# Patient Record
Sex: FEMALE | Race: WHITE | Employment: OTHER | URBAN - METROPOLITAN AREA
[De-identification: names, ages, dates, MRNs, and addresses within clinical notes are randomized per-mention and may not be internally consistent; named-entity substitution may affect disease eponyms.]

---

## 2018-08-01 LAB — COLONOSCOPY, EXTERNAL: NORMAL

## 2018-08-24 RX ORDER — LORAZEPAM 0.5 MG/1
0.12 TABLET ORAL
COMMUNITY

## 2018-08-24 NOTE — PERIOP NOTES
[facility]  Ambulatory Surgery Unit  Pre-operative Instructions for Endo Procedures    Procedure Date  8/29            Tentative Arrival Time 0900      1. On the day of your procedure, please report to the Ambulatory Surgery Unit Registration Desk and sign in at your designated time. The Ambulatory Surgery Unit is located in [facility] on the [facility] side of the [facility] across from the [address] [place] [address]. Please have all of your health insurance cards and a photo ID. 2. You must have someone with you to drive you home, as you should not drive a car for 24 hours following anesthesia. Please make arrangements for a responsible adult friend or family member to stay with you for at least the first 24 hours after your procedure. 3. Do not have anything to eat or drink (including water, gum, mints, coffee, juice) after midnight   8/28. This may not apply to medications prescribed by your physician. (Please note below the special instructions with medications to take the morning of your procedure.)    4. If applicable, follow the clear liquid diet and bowel prep instructions provided by your physician's office. If you do not have this information, or have any questions, please contact your physician's office. 5. We recommend you do not drink any alcoholic beverages for 24 hours before and after your procedure. 6. Contact your surgeons office for instructions on the following medications: non-steroidal anti-inflammatory drugs (i.e. Advil, Aleve), vitamins, and supplements. (Some surgeons will want you to stop these medications prior to surgery and others may allow you to take them)   **If you are currently taking Plavix, Coumadin, Aspirin and/or other blood-thinning agents, contact your surgeon for instructions. ** Your surgeon will partner with the physician prescribing these medications to determine if it is safe to stop or if you need to continue taking.  Please do not stop taking these medications without instructions from your surgeon. 7. In an effort to help prevent surgical site infection, we ask that you shower with an anti-bacterial soap (i.e. Dial or Safeguard) on the morning of your procedure. Do not apply any lotions, powders, or deodorants after showering. 8. Wear comfortable clothes. Wear glasses instead of contacts. Do not bring any jewelry or money (other than copays or fees as instructed). Do not wear make-up, particularly mascara, the morning of your procedure. Wear your hair loose or down, no ponytails, buns, delphine pins or clips. All body piercings must be removed. 9. You should understand that if you do not follow these instructions your procedure may be cancelled. If your physical condition changes (i.e. fever, cold or flu) please contact your surgeon as soon as possible. 10. It is important that you be on time. If a situation occurs where you may be late, or if you have any questions or problems, please call [phone]. 11. Your procedure time may be subject to change. You will receive a phone call the day prior to confirm your arrival time. Special Instructions: Take all medications and inhalers, as prescribed, on the morning of surgery with a sip of water EXCEPT: none    I understand a pre-operative phone call will be made to verify my procedure time. In the event that I am not available, I give permission for a message to be left on my answering service and/or with another person?       yes    Reviewed by phone with pt, verbalized understanding     ___________________      ___________________      ___________________  (Signature of Patient)          (Witness)                   (Date and Time)

## 2018-08-28 ENCOUNTER — ANESTHESIA EVENT (OUTPATIENT)
Dept: SURGERY | Age: 68
End: 2018-08-28
Payer: MEDICARE

## 2018-08-29 ENCOUNTER — HOSPITAL ENCOUNTER (OUTPATIENT)
Age: 68
Setting detail: OUTPATIENT SURGERY
Discharge: HOME | End: 2018-08-29
Attending: COLON & RECTAL SURGERY | Admitting: COLON & RECTAL SURGERY
Payer: MEDICARE

## 2018-08-29 ENCOUNTER — ANESTHESIA (OUTPATIENT)
Dept: SURGERY | Age: 68
End: 2018-08-29
Payer: MEDICARE

## 2018-08-29 VITALS
WEIGHT: 113 LBS | TEMPERATURE: 97.8 F | OXYGEN SATURATION: 97 % | HEART RATE: 64 BPM | DIASTOLIC BLOOD PRESSURE: 63 MMHG | SYSTOLIC BLOOD PRESSURE: 105 MMHG | HEIGHT: 63 IN | BODY MASS INDEX: 20.02 KG/M2 | RESPIRATION RATE: 14 BRPM

## 2018-08-29 PROBLEM — Z12.11 ENCOUNTER FOR SCREENING COLONOSCOPY: Status: ACTIVE | Noted: 2018-08-29

## 2018-08-29 PROCEDURE — 74011250636 HC RX REV CODE- 250/636: Performed by: ANESTHESIOLOGY

## 2018-08-29 PROCEDURE — 76210000046 HC AMBSU PH II REC FIRST 0.5 HR: Performed by: COLON & RECTAL SURGERY

## 2018-08-29 PROCEDURE — 77030020255 HC SOL INJ LR 1000ML BG: Performed by: COLON & RECTAL SURGERY

## 2018-08-29 PROCEDURE — 76030000000 HC AMB SURG OR TIME 0.5 TO 1: Performed by: COLON & RECTAL SURGERY

## 2018-08-29 PROCEDURE — 76210000040 HC AMBSU PH I REC FIRST 0.5 HR: Performed by: COLON & RECTAL SURGERY

## 2018-08-29 PROCEDURE — 74011250636 HC RX REV CODE- 250/636

## 2018-08-29 PROCEDURE — 76060000061 HC AMB SURG ANES 0.5 TO 1 HR: Performed by: COLON & RECTAL SURGERY

## 2018-08-29 RX ORDER — PROPOFOL 10 MG/ML
INJECTION, EMULSION INTRAVENOUS AS NEEDED
Status: DISCONTINUED | OUTPATIENT
Start: 2018-08-29 | End: 2018-08-29 | Stop reason: HOSPADM

## 2018-08-29 RX ORDER — SODIUM CHLORIDE 0.9 % (FLUSH) 0.9 %
5-10 SYRINGE (ML) INJECTION EVERY 8 HOURS
Status: DISCONTINUED | OUTPATIENT
Start: 2018-08-29 | End: 2018-08-29 | Stop reason: HOSPADM

## 2018-08-29 RX ORDER — LIDOCAINE HYDROCHLORIDE 10 MG/ML
0.1 INJECTION, SOLUTION EPIDURAL; INFILTRATION; INTRACAUDAL; PERINEURAL AS NEEDED
Status: DISCONTINUED | OUTPATIENT
Start: 2018-08-29 | End: 2018-08-29 | Stop reason: HOSPADM

## 2018-08-29 RX ORDER — ONDANSETRON 2 MG/ML
4 INJECTION INTRAMUSCULAR; INTRAVENOUS AS NEEDED
Status: DISCONTINUED | OUTPATIENT
Start: 2018-08-29 | End: 2018-08-29 | Stop reason: HOSPADM

## 2018-08-29 RX ORDER — SODIUM CHLORIDE 0.9 % (FLUSH) 0.9 %
5-10 SYRINGE (ML) INJECTION AS NEEDED
Status: DISCONTINUED | OUTPATIENT
Start: 2018-08-29 | End: 2018-08-29 | Stop reason: HOSPADM

## 2018-08-29 RX ORDER — SODIUM CHLORIDE, SODIUM LACTATE, POTASSIUM CHLORIDE, CALCIUM CHLORIDE 600; 310; 30; 20 MG/100ML; MG/100ML; MG/100ML; MG/100ML
25 INJECTION, SOLUTION INTRAVENOUS CONTINUOUS
Status: DISCONTINUED | OUTPATIENT
Start: 2018-08-29 | End: 2018-08-29 | Stop reason: HOSPADM

## 2018-08-29 RX ORDER — FENTANYL CITRATE 50 UG/ML
25 INJECTION, SOLUTION INTRAMUSCULAR; INTRAVENOUS
Status: DISCONTINUED | OUTPATIENT
Start: 2018-08-29 | End: 2018-08-29 | Stop reason: HOSPADM

## 2018-08-29 RX ORDER — DIPHENHYDRAMINE HYDROCHLORIDE 50 MG/ML
12.5 INJECTION, SOLUTION INTRAMUSCULAR; INTRAVENOUS AS NEEDED
Status: DISCONTINUED | OUTPATIENT
Start: 2018-08-29 | End: 2018-08-29 | Stop reason: HOSPADM

## 2018-08-29 RX ADMIN — PROPOFOL 10 MG: 10 INJECTION, EMULSION INTRAVENOUS at 12:05

## 2018-08-29 RX ADMIN — PROPOFOL 10 MG: 10 INJECTION, EMULSION INTRAVENOUS at 12:01

## 2018-08-29 RX ADMIN — PROPOFOL 20 MG: 10 INJECTION, EMULSION INTRAVENOUS at 12:03

## 2018-08-29 RX ADMIN — PROPOFOL 20 MG: 10 INJECTION, EMULSION INTRAVENOUS at 12:07

## 2018-08-29 RX ADMIN — PROPOFOL 70 MG: 10 INJECTION, EMULSION INTRAVENOUS at 11:57

## 2018-08-29 RX ADMIN — PROPOFOL 10 MG: 10 INJECTION, EMULSION INTRAVENOUS at 12:10

## 2018-08-29 RX ADMIN — PROPOFOL 10 MG: 10 INJECTION, EMULSION INTRAVENOUS at 12:00

## 2018-08-29 RX ADMIN — SODIUM CHLORIDE, SODIUM LACTATE, POTASSIUM CHLORIDE, AND CALCIUM CHLORIDE 25 ML/HR: 600; 310; 30; 20 INJECTION, SOLUTION INTRAVENOUS at 09:18

## 2018-08-29 RX ADMIN — PROPOFOL 30 MG: 10 INJECTION, EMULSION INTRAVENOUS at 12:06

## 2018-08-29 RX ADMIN — PROPOFOL 20 MG: 10 INJECTION, EMULSION INTRAVENOUS at 12:12

## 2018-08-29 NOTE — IP AVS SNAPSHOT
[address] [place] [address] 
[phone] Patient: Edith Pfeiffer MRN: [id number] Jacinta Loera About your hospitalization You were admitted on:  August 29, 2018 You last received care in the:  [facility] ASU PACU You were discharged on:  August 29, 2018 Why you were hospitalized Your primary diagnosis was:  Encounter For Screening Colonoscopy Follow-up Information Follow up With Details Comments Contact Info Darren Rashid, MD   Patient can only remember the practice name and not the physician Discharge Orders None A check flora indicates which time of day the medication should be taken. My Medications CONTINUE taking these medications Instructions Each Dose to Equal  
 Morning Noon Evening Bedtime ATIVAN 0.5 mg tablet Generic drug:  LORazepam  
Notes to Patient:  Do not take today Take 0.125 mg by mouth nightly as needed for Anxiety. 0.125 mg Discharge Instructions Edith Pfeiffer 
[id number] 
1950 COLON DISCHARGE INSTRUCTIONS Discomfort: 
Redness at IV site- apply warm compress to area; if redness or soreness persist- contact your physician There may be a slight amount of blood passed from the rectum Gaseous discomfort- walking, belching will help relieve any discomfort You may not operate a vehicle for 24 hours You may not engage in an occupation involving machinery or appliances for rest of today You may not drink alcoholic beverages for at least 24 hours Avoid making any critical decisions for at least 24 hour DIET: 
 Regular diet.  however -  remember your colon is empty and a heavy meal will produce gas. Avoid these foods:  vegetables, fried / greasy foods, carbonated drinks for today ACTIVITY: 
You may not  resume your normal daily activities it is recommended that you spend the remainder of the day resting -  avoid any strenuous activity. CALL M.D. ANY SIGN OF: Increasing pain, nausea, vomiting Abdominal distension (swelling) New increased bleeding (oral or rectal) Fever (chills) Pain in chest area Bloody discharge from nose or mouth Shortness of breath You may take any Advil, Aspirin, Ibuprofen, Motrin, Aleve, or Goodys or Tylenol as needed for pain. Post procedure diagnosis:  DIVERTICULOSIS Follow-up Instructions: 
 Call Dr. Cindy Noe if any questions Telephone #  [phone] Additional instructions ;  followup c-scope in 10 years. Colby Hathaway 
[id number] 
1950 DISCHARGE SUMMARY from Nurse The following personal items collected during your admission are returned to you:  
Dental Appliance: Dental Appliances: None Vision: Visual Aid: None Hearing Aid:   
Jewelry:   
Clothing:   
Other Valuables:   
Valuables sent to safe:   
 
 
DO NOT TAKE SLEEPING MEDICATIONS OR ANTIANXIETY MEDICATIONS WHILE TAKING NARCOTIC PAIN MEDICATIONS,  ESPECIALLY THE NIGHT OF ANESTHESIA. CPAP PATIENTS BE SURE TO WEAR MACHINE WHENEVER NAPPING OR SLEEPING. DISCHARGE SUMMARY from Nurse The following personal items collected during your admission are returned to you:  
Dental Appliance: Dental Appliances: None Vision: Visual Aid: None Hearing Aid:   
Jewelry:   
Clothing:   
Other Valuables:   
Valuables sent to safe:   
 
 
PATIENT INSTRUCTIONS: 
 
 
B - Balance E - Eyes F-  Face looks uneven A-  Arms unable to move or move even S-  Speech slurred or non-existent T-  Time-call 911 as soon as signs and symptoms begin-DO NOT go Back to bed or wait to see if you get better-TIME IS BRAIN. If you have not received your influenza and/or pneumococcal vaccine, please follow up with your primary care physician. The discharge information has been reviewed with the patient and caregiver. The patient and caregiver verbalized understanding. Introducing [facility]! [employer] introduces Educerus patient portal. Now you can access parts of your medical record, email your doctor's office, and request medication refills online. 1. In your internet browser, go to [email or web address] 2. Click on the First Time User? Click Here link in the Sign In box. You will see the New Member Sign Up page. 3. Enter your Njuice Access Code exactly as it appears below. You will not need to use this code after youve completed the sign-up process. If you do not sign up before the expiration date, you must request a new code. · Njuice Access Code: [email or web address] Expires: 11/7/2018 10:24 AM 
 
4. Enter the last four digits of your Social Security Number (xxxx) and Date of Birth (mm/dd/yyyy) as indicated and click Submit. You will be taken to the next sign-up page. 5. Create a Njuice ID. This will be your Njuice login ID and cannot be changed, so think of one that is secure and easy to remember. 6. Create a Njuice password. You can change your password at any time. 7. Enter your Password Reset Question and Answer. This can be used at a later time if you forget your password. 8. Enter your e-mail address. You will receive e-mail notification when new information is available in [address] 9. Click Sign Up. You can now view and download portions of your medical record. 10. Click the Download Summary menu link to download a portable copy of your medical information. If you have questions, please visit the Frequently Asked Questions section of the Njuice website. Remember, Njuice is NOT to be used for urgent needs. For medical emergencies, dial 911. Now available from your iPhone and Android! Introducing Micheal Clay As a [facility] patient, I wanted to make you aware of our electronic visit tool called Micheal Clay. [facility] 24/7 allows you to connect within minutes with a medical provider 24 hours a day, seven days a week via a mobile device or tablet or logging into a secure website from your computer. You can access Micheal Clay from anywhere in the [place].  
 
A virtual visit might be right for you when you have a simple condition and feel like you just dont want to get out of bed, or cant get away from work for an appointment, when your regular [employer] provider is not available (evenings, weekends or holidays), or when youre out of town and need minor care. Electronic visits cost only $49 and if the [employer] 24/7 provider determines a prescription is needed to treat your condition, one can be electronically transmitted to a nearby pharmacy*. Please take a moment to enroll today if you have not already done so. The enrollment process is free and takes just a few minutes. To enroll, please download the [employer] 24/7 jermaine to your tablet or phone, or visit [email or web address] to enroll on your computer. And, as an [address] [place] [address] patient with a [employer] account, the results of your visits will be scanned into your electronic medical record and your primary care provider will be able to view the scanned results. We urge you to continue to see your regular [employer] provider for your ongoing medical care. And while your primary care provider may not be the one available when you seek a SignStoreygorgefin virtual visit, the peace of mind you get from getting a real diagnosis real time can be priceless. For more information on PrimeSense, view our Frequently Asked Questions (FAQs) at [email or web address]. Sincerely, 
 
Shirin Luna MD 
Chief Medical Officer [employer] *:  certain medications cannot be prescribed via PrimeSense Providers Seen During Your Hospitalization Provider Specialty Primary office phone Javier Linares MD Colon and Rectal Surgery [phone] Your Primary Care Physician (PCP) Primary Care Physician Office Phone Office Fax OTHER, PHYS ** None ** ** None ** You are allergic to the following Allergen Reactions Pcn (Penicillins) Hives Swelling Recent Documentation Height Weight BMI OB Status Smoking Status 1.6 m 51.3 kg 20.02 kg/m2 Hysterectomy Never Smoker Emergency Contacts Name Discharge Info Relation Home Work Mobile Page,Karmen Gupta CAREGIVER [3] Daughter [21] [phone] Patient Belongings The following personal items are in your possession at time of discharge: 
  Dental Appliances: None  Visual Aid: None Discharge Instructions Attachments/References DIVERTICULOSIS (ENGLISH) Patient Handouts Diverticulosis: Care Instructions Your Care Instructions In diverticulosis, pouches called diverticula form in the wall of the large intestine (colon). The pouches do not cause any pain or other symptoms. Most people who have diverticulosis do not know they have it. But the pouches sometimes bleed, and if they become infected, they can cause pain and other symptoms. When this happens, it is called diverticulitis. Diverticula form when pressure pushes the wall of the colon outward at certain weak points. A diet that is too low in fiber can cause diverticula. Follow-up care is a key part of your treatment and safety. Be sure to make and go to all appointments, and call your doctor if you are having problems. It's also a good idea to know your test results and keep a list of the medicines you take. How can you care for yourself at home? · Include fruits, leafy green vegetables, beans, and whole grains in your diet each day. These foods are high in fiber. · Take a fiber supplement, such as Citrucel or Metamucil, every day if needed. Read and follow all instructions on the label. · Drink plenty of fluids, enough so that your urine is light yellow or clear like water. If you have kidney, heart, or liver disease and have to limit fluids, talk with your doctor before you increase the amount of fluids you drink. · Get at least 30 minutes of exercise on most days of the week.  Walking is a good choice. You also may want to do other activities, such as running, swimming, cycling, or playing tennis or team sports. · Cut out foods that cause gas, pain, or other symptoms. When should you call for help? Call your doctor now or seek immediate medical care if: 
  · You have belly pain.  
  · You pass maroon or very bloody stools.  
  · You have a fever.  
  · You have nausea and vomiting.  
  · You have unusual changes in your bowel movements or abdominal swelling.  
  · You have burning pain when you urinate.  
  · You have abnormal vaginal discharge.  
  · You have shoulder pain.  
  · You have cramping pain that does not get better when you have a bowel movement or pass gas.  
  · You pass gas or stool from your urethra while urinating.  
 Watch closely for changes in your health, and be sure to contact your doctor if you have any problems. Where can you learn more? Go to http://tika-indra.info/. Enter V856 in the search box to learn more about \"Diverticulosis: Care Instructions. \" Current as of: May 12, 2017 Content Version: 11.7 © 5804-0917 Fliqz. Care instructions adapted under license by [employer] (which disclaims liability or warranty for this information). If you have questions about a medical condition or this instruction, always ask your healthcare professional. [address] any warranty or liability for your use of this information. Please provide this summary of care documentation to your next provider. Signatures-by signing, you are acknowledging that this After Visit Summary has been reviewed with you and you have received a copy. Patient Signature:  ____________________________________________________________ Date:  ____________________________________________________________  
  
[facility]  Provider Signature: ____________________________________________________________ Date:  ____________________________________________________________

## 2018-08-29 NOTE — PERIOP NOTES
Permission received to review discharge instructions and discuss private health information with Yue Caro, friend.

## 2018-08-29 NOTE — BRIEF OP NOTE
BRIEF OPERATIVE NOTE    Date of Procedure: 8/29/2018   Preoperative Diagnosis: SCREENING  Postoperative Diagnosis: DIVERTICULOSIS    Procedure(s):  COLONOSCOPY  Surgeon(s) and Role:     * Sabiha Gatica MD - Primary         Surgical Assistant: none    Surgical Staff:  Circ-1: Kaylen Garcia RN  Scrub Tech-1: Clark Macias. Eva  Event Time In   Incision Start 1200   Incision Close 1217     Anesthesia: MAC   Estimated Blood Loss: none  Specimens: * No specimens in log *   Findings: sigmoid diverticulosis;   No polyps or inflamation   Complications: none  Implants: * No implants in log *

## 2018-08-29 NOTE — ANESTHESIA PREPROCEDURE EVALUATION
Anesthetic History No history of anesthetic complications Review of Systems / Medical History Patient summary reviewed, nursing notes reviewed and pertinent labs reviewed Pulmonary Within defined limits Neuro/Psych Within defined limits Cardiovascular Within defined limits Exercise tolerance: >4 METS 
  
GI/Hepatic/Renal 
Within defined limits Endo/Other Within defined limits Other Findings Comments: Lupus; in remission. Just has fatigue. [place] Physical Exam 
 
Airway Mallampati: I 
TM Distance: 4 - 6 cm Neck ROM: normal range of motion Mouth opening: Normal 
 
 Cardiovascular Rhythm: regular Rate: normal 
 
 
Pertinent negatives: No murmur Dental 
 
Dentition: Caps/crowns Pulmonary Breath sounds clear to auscultation Abdominal 
GI exam deferred Other Findings Anesthetic Plan ASA: 2 Anesthesia type: general and total IV anesthesia Induction: Intravenous Anesthetic plan and risks discussed with: Patient

## 2018-08-29 NOTE — INTERVAL H&P NOTE
H&P Update:  Momo Schroeder was seen and examined. History and physical has been reviewed. The patient has been examined.  There have been no significant clinical changes since the completion of the originally dated History and Physical.    Signed By: Roland Null MD     August 29, 2018 11:36 AM

## 2018-08-29 NOTE — IP AVS SNAPSHOT
Summary of Care Report The Summary of Care report has been created to help improve care coordination. Users with access to Chug or [employer] [facility] (Web-based application) may access additional patient information including the Discharge Summary. If you are not currently a [address] user and need more information, please call the number listed below in the [place] [address] section and ask to be connected with Medical Records. Facility Information Name Address Phone [address] [address] [phone] Patient Information Patient Name Sex NAZIA Zuniga ([id number]) Female 1950 Discharge Information Admitting Provider Service Area Unit Sherry Shankar MD / [address] [place] [address] [phone] Discharge Provider Discharge Date/Time Discharge Disposition Destination (none) 2018 12:23 (Pending) AHR (none) Patient Language Language ENGLISH [13] Hospital Problems as of 2018  Reviewed: 2018 11:36 AM by Sherry Shankar MD  
  
  
  
 Class Noted - Resolved Last Modified POA Active Problems * (Principal)Encounter for screening colonoscopy  2018 - Present 2018 by Sherry Shankar MD Yes Entered by Sherry Shankar MD  
  
Non-Hospital Problems as of 2018  Reviewed: 2018 11:36 AM by Sherry Shankar MD  
 None You are allergic to the following Allergen Reactions Pcn (Penicillins) Hives Swelling Current Discharge Medication List  
  
CONTINUE these medications which have NOT CHANGED Dose & Instructions Dispensing Information Comments ATIVAN 0.5 mg tablet Generic drug:  LORazepam  
Notes to Patient:  Do not take today Dose:  0.125 mg Take 0.125 mg by mouth nightly as needed for Anxiety. Refills:  0 Surgery Information ID Date/Time Status Primary Surgeon All Procedures Location [id number] 8/29/2018 1000 Unposted Giulia Lala MD COLONOSCOPY [facility] Follow-up Information Follow up With Details Comments Contact Info Darren Rashid MD   Patient can only remember the practice name and not the physician Discharge Instructions Edith Pfeiffer 
[id number] 
1950 COLON DISCHARGE INSTRUCTIONS Discomfort: 
Redness at IV site- apply warm compress to area; if redness or soreness persist- contact your physician There may be a slight amount of blood passed from the rectum Gaseous discomfort- walking, belching will help relieve any discomfort You may not operate a vehicle for 24 hours You may not engage in an occupation involving machinery or appliances for rest of today You may not drink alcoholic beverages for at least 24 hours Avoid making any critical decisions for at least 24 hour DIET: 
 Regular diet.  however -  remember your colon is empty and a heavy meal will produce gas. Avoid these foods:  vegetables, fried / greasy foods, carbonated drinks for today ACTIVITY: 
You may not  resume your normal daily activities it is recommended that you spend the remainder of the day resting -  avoid any strenuous activity. CALL M.D. ANY SIGN OF: Increasing pain, nausea, vomiting Abdominal distension (swelling) New increased bleeding (oral or rectal) Fever (chills) Pain in chest area Bloody discharge from nose or mouth Shortness of breath You may take any Advil, Aspirin, Ibuprofen, Motrin, Aleve, or Goodys or Tylenol as needed for pain. Post procedure diagnosis:  DIVERTICULOSIS Follow-up Instructions: 
 Call Dr. Preeti Villasenor if any questions Telephone #  [phone] Additional instructions ;  followup c-scope in 10 years. Edith Pfeiffer 
[id number] 
1950 DISCHARGE SUMMARY from Nurse The following personal items collected during your admission are returned to you:  
Dental Appliance: Dental Appliances: None Vision: Visual Aid: None Hearing Aid:   
Jewelry:   
Clothing:   
Other Valuables:   
Valuables sent to safe:   
 
 
DO NOT TAKE SLEEPING MEDICATIONS OR ANTIANXIETY MEDICATIONS WHILE TAKING NARCOTIC PAIN MEDICATIONS,  ESPECIALLY THE NIGHT OF ANESTHESIA. CPAP PATIENTS BE SURE TO WEAR MACHINE WHENEVER NAPPING OR SLEEPING. DISCHARGE SUMMARY from Nurse The following personal items collected during your admission are returned to you:  
Dental Appliance: Dental Appliances: None Vision: Visual Aid: None Hearing Aid:   
Jewelry:   
Clothing:   
Other Valuables:   
Valuables sent to safe:   
 
 
PATIENT INSTRUCTIONS: 
 
 
B - Balance E - Eyes F-  Face looks uneven A-  Arms unable to move or move even S-  Speech slurred or non-existent T-  Time-call 911 as soon as signs and symptoms begin-DO NOT go Back to bed or wait to see if you get better-TIME IS BRAIN. If you have not received your influenza and/or pneumococcal vaccine, please follow up with your primary care physician. The discharge information has been reviewed with the patient and caregiver. The patient and caregiver verbalized understanding. Chart Review Routing History Recipient Method Report Sent By Pippa Lala MD  
Fax: [phone] Phone: [phone] Fax IP Auto Routed Bradly England  7586 8/29/2018 11:35 AM 08/29/2018 Keith Seo, JENNIE Fax: [phone] Phone: [phone] Fax IP Auto Routed Bradly England  7586 8/29/2018 11:35 AM 08/29/2018

## 2018-08-29 NOTE — PERIOP NOTES
[facility]  1950  [id number]    Situation:  Verbal report given from: RAEANN Foster, [address] Milagro Kemp CRNA  Procedure: Procedure(s):  COLONOSCOPY    Background:    Preoperative diagnosis: SCREENING    Postoperative diagnosis: DIVERTICULOSIS    :  Dr. Minnie Rao    Assistant(s): Circ-1: Riley Mcdonald RN  Scrub Tech-1: Moose Velasquez    Specimens: * No specimens in log *    Assessment:  Intra-procedure medications         Anesthesia gave intra-procedure sedation and medications, see anesthesia flow sheet     Intravenous fluids: LR@ KVO     Vital signs stable       Recommendation:    Permission to share finding with Amarilys Hamilton, friend.

## 2018-08-29 NOTE — OP NOTES
[address]  OPERATIVE REPORT    Luisa Lima  MR#: [id number]  : 1950  ACCOUNT #: [de-identified]   DATE OF SERVICE: 2018    PREOPERATIVE DIAGNOSIS:  For 10-year screening interval colonoscopy. POSTOPERATIVE DIAGNOSIS:  For 10-year screening interval colonoscopy with sigmoid diverticulosis, but no evidence of any inflammation or colonic polyps. PROCEDURE PERFORMED:  Total colonoscopy to cecum. SURGEON:  Harpreet Caba MD    ANESTHESIA:  IV sedation with propofol per Anesthesia. ESTIMATED BLOOD LOSS:  None. SPECIMENS REMOVED:  None. COMPLICATIONS:  None. INDICATIONS:  The patient is a very nice 70-year-old white female who presents for 10-year interval screening colonoscopy. She is aware of the risks of the procedure including bleeding, perforation and the risk of missing small polyps and she is agreeable to proceed. PROCEDURE IN DETAIL:  After uneventful induction of IV sedation, the patient was placed in the left lateral position and the pediatric 180 stiffening scope passed through the colon to the cecal cap without difficulty. Position was confirmed with light reflex and multiple anatomic landmarks. Prep was excellent. Photograph was obtained of the ileocecal valve to document location on anatomy. Scope was slowly and carefully pulled back. The ascending, transverse and descending colon were normal.  The sigmoid was marked by a number of diverticula, but none were actively inflamed and there were no polyps. Scope was pulled back through this area down to the rectum. Rectum was unremarkable. There were no polyps. Air was aspirated. The scope was removed. The anal canal was within normal limits. Patient tolerated the exam well. She remained stable and left the endoscopy suite in satisfactory condition. She opted to undergo a followup exam in 10 years given her findings today.       MD DEE DEE Montero /   D: 2018 12:28 T:  08/29/2018 13:42  JOB #: [id number]  CC: Silvia Mota MD  CC: Mary Gil NP

## 2018-08-29 NOTE — PERIOP NOTES
1225: Patient is resting with eyes closed. Respirations are even, nonlabored. No signs of distress noted. 1230: Patient now awake and alert. Is sitting upright on stretcher. States that her friend Amarilys Hamilton is here today and that it is okay for dc instructions to be reviewed with her. 1233: Amarilys Hamilton, friend, now at bedside. Patient offered something to drink. Does not want anything at this time. 1240: Dc instructions reviewed with patient and friend. Both voiced understanding. Denies questions. 1245: Patient continues to deny pain/nausea. States that she is ready for dc, when able. Still does not want anything to drink. Abdomen remains soft/nontender. 1250: Patient dressed with assistance. Friend stepped out to get vehicle. Pt has all of her belongings she came with. Pt discharged home in stable condition via w/c to car. Instructed pt to get up with assistance today.

## 2018-08-29 NOTE — DISCHARGE INSTRUCTIONS
Lucrecia Cleaning  [id number]  1950    COLON DISCHARGE INSTRUCTIONS  Discomfort:  Redness at IV site- apply warm compress to area; if redness or soreness persist- contact your physician  There may be a slight amount of blood passed from the rectum  Gaseous discomfort- walking, belching will help relieve any discomfort  You may not operate a vehicle for 24 hours  You may not engage in an occupation involving machinery or appliances for rest of today  You may not drink alcoholic beverages for at least 24 hours  Avoid making any critical decisions for at least 24 hour  DIET:   Regular diet. - however -  remember your colon is empty and a heavy meal will produce gas. Avoid these foods:  vegetables, fried / greasy foods, carbonated drinks for today     ACTIVITY:  You may not  resume your normal daily activities it is recommended that you spend the remainder of the day resting -  avoid any strenuous activity. CALL M.D. ANY SIGN OF:   Increasing pain, nausea, vomiting  Abdominal distension (swelling)  New increased bleeding (oral or rectal)  Fever (chills)  Pain in chest area  Bloody discharge from nose or mouth  Shortness of breath    You may take any Advil, Aspirin, Ibuprofen, Motrin, Aleve, or Goodys or Tylenol as needed for pain. Post procedure diagnosis:  DIVERTICULOSIS  Follow-up Instructions:   Call Dr. Adrienne Foley if any questions  Telephone #  [phone]  Additional instructions ;  followup c-scope in 10 years. Lucrecia Cleaning  [id number]  1950        DISCHARGE SUMMARY from Nurse    The following personal items collected during your admission are returned to you:   Dental Appliance: Dental Appliances: None  Vision: Visual Aid: None  Hearing Aid:    Jewelry:    Clothing:    Other Valuables:    Valuables sent to safe:        DO NOT TAKE SLEEPING MEDICATIONS OR ANTIANXIETY MEDICATIONS WHILE TAKING NARCOTIC PAIN MEDICATIONS,  ESPECIALLY THE NIGHT OF ANESTHESIA.     CPAP PATIENTS BE SURE TO WEAR MACHINE WHENEVER NAPPING OR SLEEPING. DISCHARGE SUMMARY from Nurse    The following personal items collected during your admission are returned to you:   Dental Appliance: Dental Appliances: None  Vision: Visual Aid: None  Hearing Aid:    Jewelry:    Clothing:    Other Valuables:    Valuables sent to safe:        PATIENT INSTRUCTIONS:    After General Anesthesia or Intravenous Sedation, for 24 hours or while taking prescription Narcotics:        Someone should be with you for the next 24 hours. For your own safety, a responsible adult must drive you home. · Limit your activities  · Recommended activity: Rest today, up with assistance today. Do not climb stairs or shower unattended for the next 24 hours. · Please start with a soft bland diet and advance as tolerated (no nausea) to regular diet. · If you have a sore throat you should try the following: fluids, warm salt water gargles, or throat lozenges. If it does not improve after several days please follow up with your primary physician. · Do not drive and operate hazardous machinery  · Do not make important personal or business decisions  · Do  not drink alcoholic beverages  · If you have not urinated within 8 hours after discharge, please contact your surgeon on call. Report the following to your surgeon:  · Excessive pain, swelling, redness or odor of or around the surgical area  · Temperature over 100.5  · Nausea and vomiting lasting longer than 4 hours or if unable to take medications  · Any signs of decreased circulation or nerve impairment to extremity: change in color, persistent  numbness, tingling, coldness or increase pain      · You will receive a Post Operative Call from one of the Recovery Room Nurses on the day after your surgery to check on you. It is very important for us to know how you are recovering after your surgery.  If you have an issue or need to speak with someone, please call your surgeon, do not wait for the post operative call. · You may receive an e-mail or letter in the mail from [employer] regarding your experience with us in the Ambulatory Surgery Unit. Your feedback is valuable to us and we appreciate your participation in the survey. · If the above instructions are not adequate, please contact Mundo Simpson RN, [facility] anesthesia Nurse Manager or our Anesthesiologist, at [phone]. If you are having problems after your surgery, call the physician at his office number. · We wish you a speedy recovery ? What to do at Home:      *  Please give a list of your current medications to your Primary Care Provider. *  Please update this list whenever your medications are discontinued, doses are      changed, or new medications (including over-the-counter products) are added. *  Please carry medication information at all times in case of emergency situations. These are general instructions for a healthy lifestyle:    No smoking/ No tobacco products/ Avoid exposure to second hand smoke    Surgeon General's Warning:  Quitting smoking now greatly reduces serious risk to your health. Obesity, smoking, and sedentary lifestyle greatly increases your risk for illness    A healthy diet, regular physical exercise & weight monitoring are important for maintaining a healthy lifestyle    You may be retaining fluid if you have a history of heart failure or if you experience any of the following symptoms:  Weight gain of 3 pounds or more overnight or 5 pounds in a week, increased swelling in our hands or feet or shortness of breath while lying flat in bed. Please call your doctor as soon as you notice any of these symptoms; do not wait until your next office visit.     Recognize signs and symptoms of STROKE:    B - Balance  E - Eyes    F-  Face looks uneven    A-  Arms unable to move or move even    S-  Speech slurred or non-existent    T-  Time-call 911 as soon as signs and symptoms begin-DO NOT go Back to bed or wait to see if you get better-TIME IS BRAIN. If you have not received your influenza and/or pneumococcal vaccine, please follow up with your primary care physician. The discharge information has been reviewed with the patient and caregiver. The patient and caregiver verbalized understanding.

## 2018-08-29 NOTE — H&P
[facility] HISTORY AND PHYSICAL Yessi Bone 
MR#: [id number] : 1950 ACCOUNT #: [de-identified] ADMIT DATE: 2018 CHIEF COMPLAINT:  For 10-year followup colonoscopy. 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 She is in for 10 year followup exam of her colon. She moves her bowels on a daily basis. Has no nausea, vomiting, fever, chills, constipation or diarrhea. No bleeding, no weight loss, no pain and no family history of colonic polyps. PAST SURGICAL HISTORY:  Significant for hysterectomy and tubal ligation. No major medical issues. MEDICATIONS:  Ativan. ALLERGIES:  POSSIBLY PENICILLIN BUT SHE IS NOT COMPLETELY SURE OF THIS. SOCIAL HISTORY:  Rarely drinks, does not smoke. FAMILY HISTORY:  Significant for ovarian cancer in her mother, and lymphoma in her father. REVIEW OF SYSTEMS:  Significant for temperature intolerance. No other major issues. PHYSICAL EXAMINATION: 
GENERAL:  Reveals a very nice 59-year-old white female in no acute distress. VITAL SIGNS:  Blood pressure 99/48, height 5 feet 3 inches, weight 115 pounds, BMI is 20.4. ENT:  Clear and unremarkable. NECK:  Supple. LUNGS:  Clear. HEART:  Regular, without murmur or failure. ABDOMEN:  Benign. Groins negative. RECTAL:  Will be done at the time of colonoscopy. EXTREMITIES:  No gross deformity. NEUROLOGIC:  Intact. SKIN:  Clear. ASSESSMENT AND PLAN:  A 59-year-old female for a 10-year interval screening colonoscopy. RECOMMENDATIONS:  She is aware of the risks of the procedure including bleeding, perforation and the risk of missing small polyps. She is agreeable to proceed and will move forward with exam today. MD DEE DEE Blackwood/CHARLENE 
D: 2018 00:26    
T: 2018 00:39 
JOB #: [id number] CC: Goran Hurley MD 
CC: Shoshana Esqueda NP

## 2018-08-29 NOTE — ANESTHESIA POSTPROCEDURE EVALUATION
Post-Anesthesia Evaluation and Assessment Patient: Damon Álvarez MRN: [id number]  SSN: [id number] YOB: 1950  Age: 77 y.o. Sex: female Cardiovascular Function/Vital Signs Visit Vitals  /63 (BP 1 Location: Left arm, BP Patient Position: Sitting)  Pulse 64  Temp 36.6 °C (97.8 °F)  Resp 14  
 Ht 5' 3\" (1.6 m)  Wt 51.3 kg (113 lb)  SpO2 97%  BMI 20.02 kg/m2 Patient is status post general, total IV anesthesia anesthesia for Procedure(s): 
COLONOSCOPY. Nausea/Vomiting: None Postoperative hydration reviewed and adequate. Pain: 
Pain Scale 1: Numeric (0 - 10) (08/29/18 1235) Pain Intensity 1: 0 (08/29/18 1235) Managed Neurological Status:  
Neuro (WDL): Within Defined Limits (08/29/18 1235) Neuro Neurologic State: Alert (08/29/18 1235) LUE Motor Response: Purposeful;Spontaneous  (08/29/18 1235) LLE Motor Response: Purposeful;Spontaneous  (08/29/18 1235) RUE Motor Response: Purposeful;Spontaneous  (08/29/18 1235) RLE Motor Response: Purposeful;Spontaneous  (08/29/18 1235) At baseline Mental Status and Level of Consciousness: Arousable Pulmonary Status:  
O2 Device: Room air (08/29/18 1235) Adequate oxygenation and airway patent Complications related to anesthesia: None Post-anesthesia assessment completed. No concerns Signed By: Esther Garcia MD   
 August 29, 2018

## 2018-09-26 LAB
MAMMOGRAPHY, EXTERNAL: NORMAL
PAP SMEAR, EXTERNAL: NORMAL

## 2020-07-27 VITALS
DIASTOLIC BLOOD PRESSURE: 64 MMHG | HEIGHT: 63 IN | SYSTOLIC BLOOD PRESSURE: 112 MMHG | BODY MASS INDEX: 21.4 KG/M2 | WEIGHT: 120.8 LBS

## 2020-07-27 PROBLEM — Z78.0 MENOPAUSE: Status: ACTIVE | Noted: 2020-07-27

## 2020-07-27 RX ORDER — TRETINOIN 0.25 MG/G
CREAM TOPICAL
COMMUNITY

## 2022-03-18 PROBLEM — Z78.0 MENOPAUSE: Status: ACTIVE | Noted: 2020-07-27

## 2022-03-19 PROBLEM — Z12.11 ENCOUNTER FOR SCREENING COLONOSCOPY: Status: ACTIVE | Noted: 2018-08-29

## 2022-07-08 ENCOUNTER — OFFICE VISIT (OUTPATIENT)
Dept: OBGYN CLINIC | Age: 72
End: 2022-07-08
Payer: MEDICARE

## 2022-07-08 VITALS
WEIGHT: 110 LBS | SYSTOLIC BLOOD PRESSURE: 116 MMHG | DIASTOLIC BLOOD PRESSURE: 66 MMHG | BODY MASS INDEX: 19.49 KG/M2 | HEIGHT: 63 IN

## 2022-07-08 DIAGNOSIS — F41.9 ANXIETY: ICD-10-CM

## 2022-07-08 DIAGNOSIS — Z12.72 ENCOUNTER FOR SCREENING FOR MALIGNANT NEOPLASM OF VAGINA: Primary | ICD-10-CM

## 2022-07-08 DIAGNOSIS — N95.1 MENOPAUSAL SYNDROME: ICD-10-CM

## 2022-07-08 PROBLEM — K57.30 DIVERTICULA, COLON: Status: ACTIVE | Noted: 2022-07-08

## 2022-07-08 PROCEDURE — 1101F PT FALLS ASSESS-DOCD LE1/YR: CPT | Performed by: OBSTETRICS & GYNECOLOGY

## 2022-07-08 PROCEDURE — G8420 CALC BMI NORM PARAMETERS: HCPCS | Performed by: OBSTETRICS & GYNECOLOGY

## 2022-07-08 PROCEDURE — 1090F PRES/ABSN URINE INCON ASSESS: CPT | Performed by: OBSTETRICS & GYNECOLOGY

## 2022-07-08 PROCEDURE — G8510 SCR DEP NEG, NO PLAN REQD: HCPCS | Performed by: OBSTETRICS & GYNECOLOGY

## 2022-07-08 PROCEDURE — 3017F COLORECTAL CA SCREEN DOC REV: CPT | Performed by: OBSTETRICS & GYNECOLOGY

## 2022-07-08 PROCEDURE — G0101 CA SCREEN;PELVIC/BREAST EXAM: HCPCS | Performed by: OBSTETRICS & GYNECOLOGY

## 2022-07-08 RX ORDER — SERTRALINE HYDROCHLORIDE 25 MG/1
25 TABLET, FILM COATED ORAL DAILY
Qty: 90 TABLET | Refills: 0 | Status: SHIPPED | OUTPATIENT
Start: 2022-07-08

## 2022-07-08 NOTE — PROGRESS NOTES
Kat Mendez is a [phone], 66 y.o. female   No LMP recorded. Patient has had a hysterectomy. She presents for her annual    She is having anxiety about random non essential things. Menstrual status:  Cycles are hysterectomy. Flow: absent. She does not have dysmenorrhea. Medical conditions:  Since her last annual GYN exam about one year ago, she has not the following changes in her health history: none. Mammogram History:    BEV Results (most recent):  No results found for this or any previous visit. DEXA Results (most recent):  No results found for this or any previous visit. Past Medical History:   Diagnosis Date    Hearing aid worn     [place] (hard of hearing)     Lupus ([address]     as 8/24/18 pt reports in remission, symptom - fatigue. followed by pcp    Menopause 7/27/2020    Nausea & vomiting      Past Surgical History:   Procedure Laterality Date    COLONOSCOPY N/A 8/29/2018    COLONOSCOPY performed by Stella Kearney MD at [facility] AMBULATORY OR    HX APPENDECTOMY      HX COLONOSCOPY      HX HYSTERECTOMY  age 34     [address]         Prior to Admission medications    Medication Sig Start Date End Date Taking? Authorizing Provider   sertraline (ZOLOFT) 25 mg tablet Take 1 Tablet by mouth daily. Indications: repeated episodes of anxiety 7/8/22  Yes Lily Contreras MD   tretinoin (RETIN-A) 0.025 % topical cream Apply  to affected area nightly. Yes Provider, Historical   LORazepam (ATIVAN) 0.5 mg tablet Take 0.125 mg by mouth nightly as needed for Anxiety. Yes Provider, Historical       Allergies   Allergen Reactions    Pcn [Penicillins] Hives and Swelling    Flagyl [Metronidazole] Other (comments)     \"Makes my bladder feel as if it`s coming  out\"          Tobacco History:  reports that she has never smoked. She has never used smokeless tobacco.  Alcohol use:  reports no history of alcohol use. Drug use:  reports no history of drug use.     Family Medical/Cancer History:   Family History   Family history unknown: Yes          Review of Systems   Constitutional: Negative for chills, fever, malaise/fatigue and weight loss. HENT: Negative for congestion, ear pain, sinus pain and tinnitus. Eyes: Negative for blurred vision and double vision. Respiratory: Negative for cough, shortness of breath and wheezing. Cardiovascular: Negative for chest pain and palpitations. Gastrointestinal: Negative for abdominal pain, blood in stool, constipation, diarrhea, heartburn, nausea and vomiting. Genitourinary: Negative for dysuria, flank pain, frequency, hematuria and urgency. Musculoskeletal: Negative for joint pain and myalgias. Skin: Negative for itching and rash. Neurological: Negative for dizziness, weakness and headaches. Psychiatric/Behavioral: Negative for depression, memory loss and suicidal ideas. The patient is not nervous/anxious and does not have insomnia. Physical Exam  Constitutional:       Appearance: Normal appearance. HENT:      Head: Normocephalic and atraumatic. Cardiovascular:      Rate and Rhythm: Normal rate. Heart sounds: Normal heart sounds. Pulmonary:      Effort: Pulmonary effort is normal.      Breath sounds: Normal breath sounds. Chest:   Breasts:      Right: Normal.      Left: Normal.       Abdominal:      General: Abdomen is flat. Palpations: Abdomen is soft. Genitourinary:     General: Normal vulva. Vagina: Normal.      Uterus: Absent. Adnexa: Right adnexa normal and left adnexa normal.      Rectum: Normal.      Comments: PAP Obtained  Neurological:      Mental Status: She is alert. Psychiatric:         Mood and Affect: Mood normal.         Behavior: Behavior normal.         Thought Content:  Thought content normal.          Visit Vitals  /66 (BP 1 Location: Left upper arm, BP Patient Position: Sitting, BP Cuff Size: Small adult)   Ht 5' 2.8\" (1.595 m)   Wt 110 lb (49.9 kg) BMI 19.61 kg/m²         Assessment:   Diagnoses and all orders for this visit:    1. Encounter for screening for malignant neoplasm of vagina  -     PAP IG, RFX APTIMA HPV ASCUS ([id number])    2. Menopausal syndrome    3. Anxiety    Other orders  -     sertraline (ZOLOFT) 25 mg tablet; Take 1 Tablet by mouth daily. Indications: repeated episodes of anxiety        Plan: Questions addressed  Counseled re: diet, exercise, healthy lifestyle  Return for Annual  Rec annual mammogram        Follow-up and Dispositions    · Return in about 6 weeks (around 8/19/2022) for Mammogram, Follow-up.

## 2022-07-08 NOTE — PROGRESS NOTES
Chief Complaint   Patient presents with    Annual Exam     [id number]     Visit Vitals  /66 (BP 1 Location: Left upper arm, BP Patient Position: Sitting, BP Cuff Size: Small adult)   Ht 5' 2.8\" (1.595 m)   Wt 110 lb (49.9 kg)   BMI 19.61 kg/m²

## 2022-07-11 LAB
CYTOLOGIST CVX/VAG CYTO: NORMAL
CYTOLOGY CVX/VAG DOC CYTO: NORMAL
CYTOLOGY CVX/VAG DOC THIN PREP: NORMAL
DX ICD CODE: NORMAL
LABCORP, 190119: NORMAL
Lab: NORMAL
OTHER STN SPEC: NORMAL
STAT OF ADQ CVX/VAG CYTO-IMP: NORMAL

## 2023-03-13 ENCOUNTER — DOCUMENTATION ONLY (OUTPATIENT)
Dept: HEMATOLOGY | Age: 73
End: 2023-03-13

## 2023-03-13 NOTE — PROGRESS NOTES
Records received from [facility] and per updated 2/13/23 office protocal, do not meet criteria for further review, schedule next available.

## 2023-03-16 ENCOUNTER — DOCUMENTATION ONLY (OUTPATIENT)
Dept: HEMATOLOGY | Age: 73
End: 2023-03-16

## 2023-11-03 ENCOUNTER — OFFICE VISIT (OUTPATIENT)
Age: 73
End: 2023-11-03
Payer: MEDICARE

## 2023-11-03 VITALS
DIASTOLIC BLOOD PRESSURE: 65 MMHG | BODY MASS INDEX: 20.98 KG/M2 | TEMPERATURE: 96.4 F | HEART RATE: 93 BPM | HEIGHT: 62 IN | WEIGHT: 114 LBS | SYSTOLIC BLOOD PRESSURE: 120 MMHG | OXYGEN SATURATION: 96 %

## 2023-11-03 DIAGNOSIS — R74.8 ELEVATED LIVER ENZYMES: Primary | ICD-10-CM

## 2023-11-03 PROCEDURE — G8484 FLU IMMUNIZE NO ADMIN: HCPCS | Performed by: NURSE PRACTITIONER

## 2023-11-03 PROCEDURE — G8420 CALC BMI NORM PARAMETERS: HCPCS | Performed by: NURSE PRACTITIONER

## 2023-11-03 PROCEDURE — 99204 OFFICE O/P NEW MOD 45 MIN: CPT | Performed by: NURSE PRACTITIONER

## 2023-11-03 PROCEDURE — 1090F PRES/ABSN URINE INCON ASSESS: CPT | Performed by: NURSE PRACTITIONER

## 2023-11-03 PROCEDURE — 1123F ACP DISCUSS/DSCN MKR DOCD: CPT | Performed by: NURSE PRACTITIONER

## 2023-11-03 PROCEDURE — 3017F COLORECTAL CA SCREEN DOC REV: CPT | Performed by: NURSE PRACTITIONER

## 2023-11-03 PROCEDURE — 91200 LIVER ELASTOGRAPHY: CPT | Performed by: NURSE PRACTITIONER

## 2023-11-03 PROCEDURE — G8400 PT W/DXA NO RESULTS DOC: HCPCS | Performed by: NURSE PRACTITIONER

## 2023-11-03 PROCEDURE — 1036F TOBACCO NON-USER: CPT | Performed by: NURSE PRACTITIONER

## 2023-11-03 PROCEDURE — G8427 DOCREV CUR MEDS BY ELIG CLIN: HCPCS | Performed by: NURSE PRACTITIONER

## 2023-11-03 NOTE — PROGRESS NOTES
MD Phill, FACP, [place], [place]      HOWIE Myers, PCNP-BC   Quita Holley, [facility]-AG   Saji Wise, FNTENZIN Weir FNP-ISAAC Saavedra, AGPCNP-BC   Marlee Salinas, [facility]      [address]   at [facility]   [address] [facility], [address] [address] [facility], [facility] [facility]   [phone]   FAX: [phone]  [facility]   at [facility], [address] [facility], [address]   [phone]   FAX: [phone]       Patient Care Team:  María Elena Amezcua.GIULIANA - NP as PCP - General (Nurse Practitioner)    Patient Active Problem List   Diagnosis    Menopause    Encounter for screening colonoscopy    Anxiety    Diverticula, colon    Elevated liver enzymes       The clinicians listed above have asked me to see Walker Burns in consultation regarding elevated liver enzymes and its management. All medical records sent by the referring physicians were reviewed including imaging studies. The patient is a 74 y.o. female who was found to have elevated liver enzymes in 2019. The patient has a diagnosis of lupus and has previously been treated with plaquenil and prednisone. Serologic evaluation for markers of chronic liver disease was negative. The most recent imaging of the liver was Ultrasound performed in 2/2023. Results suggest that the liver is normal.      The patient had not started any new medications within 3 months preceding the elevation in liver chemistries. She has never been treated with methotrexate. In the office today the patient has the following symptoms:  The patient feels well and has no complaints.     The patient is not experiencing the following symptoms which are commonly seen in this liver disorder:   fatigue,  pain in the right side over the liver,

## 2024-01-09 ENCOUNTER — CLINICAL DOCUMENTATION (OUTPATIENT)
Age: 74
End: 2024-01-09

## 2024-04-22 ENCOUNTER — OFFICE VISIT (OUTPATIENT)
Age: 74
End: 2024-04-22
Payer: MEDICARE

## 2024-04-22 VITALS
OXYGEN SATURATION: 99 % | HEART RATE: 70 BPM | DIASTOLIC BLOOD PRESSURE: 65 MMHG | RESPIRATION RATE: 15 BRPM | TEMPERATURE: 97.3 F | BODY MASS INDEX: 21.16 KG/M2 | HEIGHT: 62 IN | SYSTOLIC BLOOD PRESSURE: 111 MMHG | WEIGHT: 115 LBS

## 2024-04-22 DIAGNOSIS — R74.8 ELEVATED LIVER ENZYMES: Primary | ICD-10-CM

## 2024-04-22 DIAGNOSIS — R74.8 ELEVATED ALKALINE PHOSPHATASE LEVEL: ICD-10-CM

## 2024-04-22 DIAGNOSIS — E83.39 OTHER DISORDERS OF PHOSPHORUS METABOLISM: ICD-10-CM

## 2024-04-22 DIAGNOSIS — R53.83 FATIGUE, UNSPECIFIED TYPE: ICD-10-CM

## 2024-04-22 PROCEDURE — 1090F PRES/ABSN URINE INCON ASSESS: CPT | Performed by: NURSE PRACTITIONER

## 2024-04-22 PROCEDURE — 91200 LIVER ELASTOGRAPHY: CPT | Performed by: NURSE PRACTITIONER

## 2024-04-22 PROCEDURE — G8400 PT W/DXA NO RESULTS DOC: HCPCS | Performed by: NURSE PRACTITIONER

## 2024-04-22 PROCEDURE — 1036F TOBACCO NON-USER: CPT | Performed by: NURSE PRACTITIONER

## 2024-04-22 PROCEDURE — G8427 DOCREV CUR MEDS BY ELIG CLIN: HCPCS | Performed by: NURSE PRACTITIONER

## 2024-04-22 PROCEDURE — 99214 OFFICE O/P EST MOD 30 MIN: CPT | Performed by: NURSE PRACTITIONER

## 2024-04-22 PROCEDURE — 1123F ACP DISCUSS/DSCN MKR DOCD: CPT | Performed by: NURSE PRACTITIONER

## 2024-04-22 PROCEDURE — 3017F COLORECTAL CA SCREEN DOC REV: CPT | Performed by: NURSE PRACTITIONER

## 2024-04-22 PROCEDURE — G8420 CALC BMI NORM PARAMETERS: HCPCS | Performed by: NURSE PRACTITIONER

## 2024-04-22 ASSESSMENT — PATIENT HEALTH QUESTIONNAIRE - PHQ9
SUM OF ALL RESPONSES TO PHQ QUESTIONS 1-9: 0
1. LITTLE INTEREST OR PLEASURE IN DOING THINGS: NOT AT ALL
SUM OF ALL RESPONSES TO PHQ QUESTIONS 1-9: 0
SUM OF ALL RESPONSES TO PHQ QUESTIONS 1-9: 0
SUM OF ALL RESPONSES TO PHQ9 QUESTIONS 1 & 2: 0
2. FEELING DOWN, DEPRESSED OR HOPELESS: NOT AT ALL
SUM OF ALL RESPONSES TO PHQ QUESTIONS 1-9: 0

## 2024-04-22 NOTE — PROGRESS NOTES
Identified pt with two pt identifiers(name and ). Reviewed record in preparation for visit and have obtained necessary documentation.  Vitals:    24 1341   BP: 111/65   Site: Right Upper Arm   Position: Sitting   Cuff Size: Medium Adult   Pulse: 70   Resp: 15   Temp: 97.3 °F (36.3 °C)   TempSrc: Temporal   SpO2: 99%   Weight: 52.2 kg (115 lb)   Height: 1.575 m (5' 2\")        Health Maintenance Review: Patient reminded of \"due or due soon\" health maintenance. I have asked the patient to contact his/her primary care provider (PCP) for follow-up on his/her health maintenance.    Coordination of Care Questionnaire:  :   1) Have you been to an emergency room, urgent care, or hospitalized since your last visit?  If yes, where when, and reason for visit? no       2. Have seen or consulted any other health care provider since your last visit?   If yes, where when, and reason for visit?  No      Patient is accompanied by self I have received verbal consent from Yesi Ortiz to discuss any/all medical information while they are present in the room.

## 2024-04-22 NOTE — PROGRESS NOTES
[facility]      Terry Pichardo MD, FACP, FACG, FAASLD      Zakia Jenkins, PA-ISAAC Reyes, [facility]   Renae Lafleurjason, [facility]   Melody Maik, [facility]-  Nasir Valdez, [facility]   Fabiana Bowers, [facility]   Mariam Simson, [facility]   [address], [address]   [place]  [address] [phone]   FAX: [phone]  [facility]   [address], [address]   [place]  [address] [phone]   FAX: [phone]       Patient Care Team:  Tapan Berry Jr., APRN - NP as PCP - General (Nurse Practitioner)    Patient Active Problem List   Diagnosis    Menopause    Encounter for screening colonoscopy    Anxiety    Diverticula, colon    Elevated liver enzymes     Yesi Ortiz is being seen at [facility] for management of elevated liver enzymes. The active problem list, all pertinent past medical history, medications, liver histology, radiologic findings   and laboratory findings related to the liver disorder were reviewed and discussed with the patient.      The patient is a 73 y.o. female who was found to have elevated liver enzymes in 2019.  The patient has a diagnosis of lupus and has previously been treated with plaquenil and prednisone.     Serologic evaluation for markers of chronic liver disease was negative.    The most recent imaging of the liver was Ultrasound performed in 2/2023.  Results suggest that the liver is normal.      The patient had not started any new medications within 3 months preceding the elevation in liver chemistries. She has never been treated with methotrexate.     In the office today the patient has the following symptoms:  The patient feels well and has no complaints.    The patient is not experiencing the following symptoms which are commonly

## 2024-04-23 LAB
25(OH)D3 SERPL-MCNC: 46.4 NG/ML (ref 30–100)
ALBUMIN SERPL-MCNC: 3.9 G/DL (ref 3.5–5)
ALBUMIN/GLOB SERPL: 1.4 (ref 1.1–2.2)
ALP SERPL-CCNC: 173 U/L (ref 45–117)
ALT SERPL-CCNC: 74 U/L (ref 12–78)
ANION GAP SERPL CALC-SCNC: 3 MMOL/L (ref 5–15)
AST SERPL-CCNC: 42 U/L (ref 15–37)
BASOPHILS # BLD: 0 K/UL (ref 0–0.1)
BASOPHILS NFR BLD: 1 % (ref 0–1)
BILIRUB DIRECT SERPL-MCNC: 0.1 MG/DL (ref 0–0.2)
BILIRUB SERPL-MCNC: 0.5 MG/DL (ref 0.2–1)
BUN SERPL-MCNC: 16 MG/DL (ref 6–20)
BUN/CREAT SERPL: 20 (ref 12–20)
CALCIUM SERPL-MCNC: 9 MG/DL (ref 8.5–10.1)
CHLORIDE SERPL-SCNC: 106 MMOL/L (ref 97–108)
CO2 SERPL-SCNC: 30 MMOL/L (ref 21–32)
CREAT SERPL-MCNC: 0.79 MG/DL (ref 0.55–1.02)
DIFFERENTIAL METHOD BLD: ABNORMAL
EOSINOPHIL # BLD: 0 K/UL (ref 0–0.4)
EOSINOPHIL NFR BLD: 1 % (ref 0–7)
ERYTHROCYTE [DISTWIDTH] IN BLOOD BY AUTOMATED COUNT: 12.8 % (ref 11.5–14.5)
GLOBULIN SER CALC-MCNC: 2.8 G/DL (ref 2–4)
GLUCOSE SERPL-MCNC: 94 MG/DL (ref 65–100)
HCT VFR BLD AUTO: 41.5 % (ref 35–47)
HGB BLD-MCNC: 14 G/DL (ref 11.5–16)
IMM GRANULOCYTES # BLD AUTO: 0 K/UL (ref 0–0.04)
IMM GRANULOCYTES NFR BLD AUTO: 0 % (ref 0–0.5)
LYMPHOCYTES # BLD: 0.9 K/UL (ref 0.8–3.5)
LYMPHOCYTES NFR BLD: 27 % (ref 12–49)
MCH RBC QN AUTO: 32.3 PG (ref 26–34)
MCHC RBC AUTO-ENTMCNC: 33.7 G/DL (ref 30–36.5)
MCV RBC AUTO: 95.8 FL (ref 80–99)
MONOCYTES # BLD: 0.3 K/UL (ref 0–1)
MONOCYTES NFR BLD: 9 % (ref 5–13)
NEUTS SEG # BLD: 2.1 K/UL (ref 1.8–8)
NEUTS SEG NFR BLD: 62 % (ref 32–75)
NRBC # BLD: 0 K/UL (ref 0–0.01)
NRBC BLD-RTO: 0 PER 100 WBC
PLATELET # BLD AUTO: 159 K/UL (ref 150–400)
PMV BLD AUTO: 10.9 FL (ref 8.9–12.9)
POTASSIUM SERPL-SCNC: 4.2 MMOL/L (ref 3.5–5.1)
PROT SERPL-MCNC: 6.7 G/DL (ref 6.4–8.2)
RBC # BLD AUTO: 4.33 M/UL (ref 3.8–5.2)
SODIUM SERPL-SCNC: 139 MMOL/L (ref 136–145)
WBC # BLD AUTO: 3.4 K/UL (ref 3.6–11)

## 2024-08-20 ENCOUNTER — OFFICE VISIT (OUTPATIENT)
Age: 74
End: 2024-08-20
Payer: MEDICARE

## 2024-08-20 VITALS
HEIGHT: 62 IN | BODY MASS INDEX: 21.06 KG/M2 | WEIGHT: 114.44 LBS | SYSTOLIC BLOOD PRESSURE: 118 MMHG | DIASTOLIC BLOOD PRESSURE: 72 MMHG

## 2024-08-20 DIAGNOSIS — Z90.710 HISTORY OF HYSTERECTOMY: ICD-10-CM

## 2024-08-20 DIAGNOSIS — Z12.31 SCREENING MAMMOGRAM FOR BREAST CANCER: ICD-10-CM

## 2024-08-20 DIAGNOSIS — N95.9 MENOPAUSAL PROBLEM: ICD-10-CM

## 2024-08-20 DIAGNOSIS — Z12.4 PAP SMEAR FOR CERVICAL CANCER SCREENING: Primary | ICD-10-CM

## 2024-08-20 PROCEDURE — G8420 CALC BMI NORM PARAMETERS: HCPCS | Performed by: OBSTETRICS & GYNECOLOGY

## 2024-08-20 PROCEDURE — G8427 DOCREV CUR MEDS BY ELIG CLIN: HCPCS | Performed by: OBSTETRICS & GYNECOLOGY

## 2024-08-20 PROCEDURE — G0101 CA SCREEN;PELVIC/BREAST EXAM: HCPCS | Performed by: OBSTETRICS & GYNECOLOGY

## 2024-08-20 ASSESSMENT — ENCOUNTER SYMPTOMS
RESPIRATORY NEGATIVE: 1
GASTROINTESTINAL NEGATIVE: 1

## 2024-08-20 NOTE — PROGRESS NOTES
Chief Complaint   Patient presents with    Annual Exam     Mammo-2023  Dexa-?     /72 (Site: Left Upper Arm, Position: Sitting, Cuff Size: Small Adult)   Ht 1.575 m (5' 2\")   Wt 51.9 kg (114 lb 7 oz)   LMP  (LMP Unknown)   BMI 20.93 kg/m²

## 2024-08-20 NOTE — PROGRESS NOTES
Yesi Ortiz is a No obstetric history on file., 73 y.o. female   No LMP recorded (lmp unknown). Patient has had a hysterectomy.    She presents for her annual    She is having no significant problems.      Menstrual status:  Cycles are hysterectomy.    Flow: absent.      She does not have dysmenorrhea.      Medical conditions:  Since her last annual GYN exam about two years ago, she has not the following changes in her health history: none.     Mammogram History:    JONNY Results (most recent):  @[employer](DTY7124:1)@     DEXA Results (most recent):  @[employer](VSV6020:1)@       Past Medical History:   Diagnosis Date    Anxiety     Hearing aid worn     [place] (hard of hearing)     Lupus (HCC)     as 8/24/18 pt reports in remission, symptom - fatigue.  followed by pcp    Menopause 7/27/2020    Nausea & vomiting      Past Surgical History:   Procedure Laterality Date    APPENDECTOMY      COLONOSCOPY N/A 8/29/2018    COLONOSCOPY performed by Osman Richardson MD at [facility] AMBULATORY OR    HYSTERECTOMY, VAGINAL      TUBAL LIGATION         Prior to Admission medications    Medication Sig Start Date End Date Taking? Authorizing Provider   LORazepam (ATIVAN) 0.5 MG tablet Take 1 tablet by mouth as needed for Anxiety.   Yes Automatic Reconciliation, Ar       Allergies   Allergen Reactions    Metronidazole Other (See Comments)     \"Makes my bladder feel as if it`s coming  out\"    Penicillins Hives and Swelling          Tobacco History:  reports that she has never smoked. She has never used smokeless tobacco.  Alcohol use:  reports no history of alcohol use.  Drug use:  reports no history of drug use.    Family Medical/Cancer History:   Family History   Problem Relation Age of Onset    Cancer Mother         ovarian    Hearing Loss Father     Rheum Arthritis Brother         Review of Systems   Constitutional: Negative.    Respiratory: Negative.     Cardiovascular: Negative.    Gastrointestinal: Negative.

## 2024-08-22 LAB
., LABCORP: NORMAL
CYTOLOGIST CVX/VAG CYTO: NORMAL
CYTOLOGY CVX/VAG DOC CYTO: NORMAL
CYTOLOGY CVX/VAG DOC THIN PREP: NORMAL
DX ICD CODE: NORMAL
Lab: NORMAL
Lab: NORMAL
OTHER STN SPEC: NORMAL
STAT OF ADQ CVX/VAG CYTO-IMP: NORMAL

## 2024-09-17 ENCOUNTER — CLINICAL DOCUMENTATION (OUTPATIENT)
Age: 74
End: 2024-09-17

## 2025-02-04 ENCOUNTER — HOSPITAL ENCOUNTER (OUTPATIENT)
Facility: HOSPITAL | Age: 75
Discharge: HOME | End: 2025-02-07
Payer: MEDICARE

## 2025-02-04 VITALS
WEIGHT: 111.2 LBS | SYSTOLIC BLOOD PRESSURE: 105 MMHG | HEART RATE: 82 BPM | BODY MASS INDEX: 20.34 KG/M2 | RESPIRATION RATE: 20 BRPM | DIASTOLIC BLOOD PRESSURE: 59 MMHG | OXYGEN SATURATION: 99 % | TEMPERATURE: 97.1 F

## 2025-02-04 DIAGNOSIS — Z17.0 CARCINOMA OF UPPER-OUTER QUADRANT OF RIGHT BREAST IN FEMALE, ESTROGEN RECEPTOR POSITIVE (HCC): Primary | ICD-10-CM

## 2025-02-04 DIAGNOSIS — C50.411 CARCINOMA OF UPPER-OUTER QUADRANT OF RIGHT BREAST IN FEMALE, ESTROGEN RECEPTOR POSITIVE (HCC): Primary | ICD-10-CM

## 2025-02-04 PROCEDURE — 99202 OFFICE O/P NEW SF 15 MIN: CPT

## 2025-02-04 NOTE — PROGRESS NOTES
[place]    Radiation Oncology Consultation    Yesi Ortiz  [id number]  1950     Diagnosis   1. IDC R breast cancer. [id number]      AJCC Staging has been reviewed.  History of Present Illness   Ms. Ortiz is a 74 y.o. female seen in consultation at the request of Dr. Preston to assess the role of radiation for her above diagnosis.    her oncologic history is detailed below:  Pleasant 75 yo found on screening mammogram to have abnormality R breast. Upper outer.  Bx on  showed IDC. Grade 2, ER/IN (+).  MRI on 2024 showed 1.3 x 0.7 lesion 9:oclock.    Second lesion at 12:00.  Proceeded to R simple mastectomy with sentinal node bx.  Path: 6x5 mm IDC grade 2, two other lesions negative for malignancy  \She has seen Dr. Preston for consideration of endocrine therapy,   , now presents to our department to diiscuss radiation options.    Breast health hx: A0,       Symptomatically, no chest wall tenderness  From a performance status standpoint, she is able to perform ADA. Ms. Ortiz denies a history of inflammatory bowel disease, scleroderma or other collagen vascular diseases, pacemaker/AICD, or history of prior irradiation.    Review of Systems:  A complete review of systems was obtained and negative except as described above.    Allergies and Medications     Allergies   Allergen Reactions    Metronidazole Other (See Comments)     \"Makes my bladder feel as if it`s coming  out\"    Penicillins Hives and Swelling       Current Outpatient Medications   Medication Instructions    LORazepam (ATIVAN) 0.5 mg, Oral, AS NEEDED    psyllium (KONSYL) 28.3 % PACK 1 packet, Oral, DAILY      Past Medical/Surgical History     Past Medical History:   Diagnosis Date    Anxiety     Hearing aid worn     [place] (hard of hearing)     Lupus     as 16 pt reports in remission, symptom - fatigue.  followed by pcp    Menopause 2020    Nausea & vomiting      Past Surgical History:   Procedure

## 2025-05-13 ENCOUNTER — OFFICE VISIT (OUTPATIENT)
Age: 75
End: 2025-05-13
Payer: MEDICARE

## 2025-05-13 VITALS
WEIGHT: 109 LBS | DIASTOLIC BLOOD PRESSURE: 54 MMHG | SYSTOLIC BLOOD PRESSURE: 110 MMHG | OXYGEN SATURATION: 98 % | HEART RATE: 84 BPM | HEIGHT: 62 IN | TEMPERATURE: 97.4 F | BODY MASS INDEX: 20.06 KG/M2

## 2025-05-13 DIAGNOSIS — R74.8 ELEVATED LIVER ENZYMES: Primary | ICD-10-CM

## 2025-05-13 PROCEDURE — 1159F MED LIST DOCD IN RCRD: CPT | Performed by: NURSE PRACTITIONER

## 2025-05-13 PROCEDURE — 91200 LIVER ELASTOGRAPHY: CPT | Performed by: NURSE PRACTITIONER

## 2025-05-13 PROCEDURE — 99214 OFFICE O/P EST MOD 30 MIN: CPT | Performed by: NURSE PRACTITIONER

## 2025-05-13 PROCEDURE — G8427 DOCREV CUR MEDS BY ELIG CLIN: HCPCS | Performed by: NURSE PRACTITIONER

## 2025-05-13 PROCEDURE — 1036F TOBACCO NON-USER: CPT | Performed by: NURSE PRACTITIONER

## 2025-05-13 PROCEDURE — G8400 PT W/DXA NO RESULTS DOC: HCPCS | Performed by: NURSE PRACTITIONER

## 2025-05-13 PROCEDURE — 1090F PRES/ABSN URINE INCON ASSESS: CPT | Performed by: NURSE PRACTITIONER

## 2025-05-13 PROCEDURE — 1126F AMNT PAIN NOTED NONE PRSNT: CPT | Performed by: NURSE PRACTITIONER

## 2025-05-13 PROCEDURE — G8420 CALC BMI NORM PARAMETERS: HCPCS | Performed by: NURSE PRACTITIONER

## 2025-05-13 PROCEDURE — 1123F ACP DISCUSS/DSCN MKR DOCD: CPT | Performed by: NURSE PRACTITIONER

## 2025-05-13 RX ORDER — ANASTROZOLE 1 MG/1
1 TABLET ORAL
COMMUNITY

## 2025-05-13 ASSESSMENT — ANXIETY QUESTIONNAIRES
3. WORRYING TOO MUCH ABOUT DIFFERENT THINGS: NOT AT ALL
5. BEING SO RESTLESS THAT IT IS HARD TO SIT STILL: NOT AT ALL
IF YOU CHECKED OFF ANY PROBLEMS ON THIS QUESTIONNAIRE, HOW DIFFICULT HAVE THESE PROBLEMS MADE IT FOR YOU TO DO YOUR WORK, TAKE CARE OF THINGS AT HOME, OR GET ALONG WITH OTHER PEOPLE: NOT DIFFICULT AT ALL
7. FEELING AFRAID AS IF SOMETHING AWFUL MIGHT HAPPEN: NOT AT ALL
1. FEELING NERVOUS, ANXIOUS, OR ON EDGE: NOT AT ALL
2. NOT BEING ABLE TO STOP OR CONTROL WORRYING: NOT AT ALL
GAD7 TOTAL SCORE: 0
4. TROUBLE RELAXING: NOT AT ALL
6. BECOMING EASILY ANNOYED OR IRRITABLE: NOT AT ALL

## 2025-05-13 ASSESSMENT — PATIENT HEALTH QUESTIONNAIRE - PHQ9
SUM OF ALL RESPONSES TO PHQ QUESTIONS 1-9: 0
2. FEELING DOWN, DEPRESSED OR HOPELESS: NOT AT ALL
1. LITTLE INTEREST OR PLEASURE IN DOING THINGS: NOT AT ALL
DEPRESSION UNABLE TO ASSESS: FUNCTIONAL CAPACITY MOTIVATION LIMITS ACCURACY
SUM OF ALL RESPONSES TO PHQ QUESTIONS 1-9: 0

## 2025-05-13 NOTE — PROGRESS NOTES
[facility]     Terry Pichardo MD, FACP, MACG, FAASLD   MD Zakia Obrien PA-C April S Ashworth, [facility]   Renae Lafleurgaganhal, [facility]  Nasir José Miguel, FNP-C   Fabiana Robinson, [facility]   [address], [address]   [place]  [address] [phone]   FAX: [phone]  [facility]   [address], [address]   [place]  [address] [phone]   FAX: [phone]       Patient Care Team:  Tapan Berry Jr., APRN - NP as PCP - General (Nurse Practitioner)    Patient Active Problem List   Diagnosis    Menopause    Encounter for screening colonoscopy    Anxiety    Diverticula, colon    Elevated liver enzymes    History of hysterectomy     Yesi Ortiz is being seen at [facility] for management of elevated liver enzymes. The active problem list, all pertinent past medical history, medications, liver histology, radiologic findings and laboratory findings related to the liver disorder were reviewed and discussed with the patient.      The patient is a 74 y.o. female who was found to have elevated liver enzymes in 2019.  The patient has a diagnosis of lupus and has previously been treated with plaquenil and prednisone.     Serologic evaluation for markers of chronic liver disease was negative.    The most recent imaging of the liver was Ultrasound performed in 2/2023.  Results suggest that the liver is normal.      The patient had not started any new medications within 3 months preceding the elevation in liver chemistries. She has never been treated with methotrexate.     Since the last clinic visit the patient was found to have breast cancer in 11/2024 & underwent double mastectomy in  12/2024. She is now being treated with anastrozole.     In the

## 2025-05-13 NOTE — PROGRESS NOTES
Chief Complaint   Patient presents with    Follow-up     Vitals:    05/13/25 1347   BP: (!) 110/54   Pulse: 84   Temp: 97.4 °F (36.3 °C)   SpO2: 98%     Have you been to the ER, urgent care clinic since your last visit?  Hospitalized since your last visit?   NO    Have you seen or consulted any other health care providers outside our system since your last visit?   NO

## (undated) DEVICE — (D)SYR 10ML 1/5ML GRAD NSAF -- PKGING CHANGE USE ITEM 338027

## (undated) DEVICE — 1200 GUARD II KIT W/5MM TUBE W/O VAC TUBE: Brand: GUARDIAN

## (undated) DEVICE — KENDALL DL ECG CABLE AND LEAD WIRE SYSTEM, 5-LEAD, SINGLE PATIENT USE: Brand: KENDALL

## (undated) DEVICE — SOLIDIFIER MEDC 1200ML -- CONVERT TO 356117

## (undated) DEVICE — SOLUTION LACTATED RINGERS INJECTION USP

## (undated) DEVICE — CONTINU-FLO SOLUTION SET, 2 INJECTION SITES, MALE LUER LOCK ADAPTER WITH RETRACTABLE COLLAR, LARGE BORE STOPCOCK WITH ROTATING MALE LUER LOCK EXTENSION SET, 2 INJECTION SITES, MALE LUER LOCK ADAPTER WITH RETRACTABLE COLLAR: Brand: INTERLINK/CONTINU-FLO

## (undated) DEVICE — ENDO CARRY-ON PROCEDURE KIT INCLUDES ENZYMATIC SPONGE, GAUZE, BIOHAZARD LABEL, TRAY, LUBRICANT, DIRTY SCOPE LABEL, WATER LABEL, TRAY, DRAWSTRING PAD, AND DEFENDO 4-PIECE KIT.: Brand: ENDO CARRY-ON PROCEDURE KIT

## (undated) DEVICE — 3M™ TEGADERM™ TRANSPARENT FILM DRESSING FRAME STYLE, 1624W, 2-3/8 IN X 2-3/4 IN (6 CM X 7 CM), 100/CT 4CT/CASE: Brand: 3M™ TEGADERM™